# Patient Record
Sex: FEMALE | Race: WHITE
[De-identification: names, ages, dates, MRNs, and addresses within clinical notes are randomized per-mention and may not be internally consistent; named-entity substitution may affect disease eponyms.]

---

## 2018-02-15 ENCOUNTER — HOSPITAL ENCOUNTER (EMERGENCY)
Dept: HOSPITAL 59 - ER | Age: 28
Discharge: HOME | End: 2018-02-15
Payer: COMMERCIAL

## 2018-02-15 DIAGNOSIS — K59.00: ICD-10-CM

## 2018-02-15 DIAGNOSIS — R10.13: Primary | ICD-10-CM

## 2018-02-15 LAB
ALBUMIN SERPL-MCNC: 4.6 G/DL (ref 4–5)
ALP SERPL-CCNC: 64 U/L (ref 35–104)
ALT SERPL-CCNC: 12 U/L (ref ?–33)
ANION GAP SERPL CALC-SCNC: 11 MMOL/L (ref 7–16)
AST SERPL-CCNC: 13 U/L (ref 10–35)
BASOPHILS NFR BLD: 0.2 % (ref 0–6)
BILIRUB DIRECT SERPL-MCNC: < 0.2 MG/DL (ref 0–0.3)
BILIRUB SERPL-MCNC: 0.3 MG/DL (ref 0.2–1)
BUN SERPL-MCNC: 15 MG/DL (ref 6–20)
CO2 SERPL-SCNC: 24 MMOL/L (ref 22–29)
CREAT SERPL-MCNC: 0.7 MG/DL (ref 0.5–0.9)
EOSINOPHIL NFR BLD: 1.1 % (ref 0–6)
ERYTHROCYTE [DISTWIDTH] IN BLOOD BY AUTOMATED COUNT: 12.6 % (ref 11.5–14.5)
EST GLOMERULAR FILTRATION RATE: > 60 ML/MIN
GLUCOSE SERPL-MCNC: 101 MG/DL (ref 74–109)
GRANULOCYTES NFR BLD: 67.5 % (ref 47–80)
HCT VFR BLD CALC: 38.6 % (ref 35–47)
HGB BLD-MCNC: 12.8 GM/DL (ref 11.6–16)
LIPASE SERPL-CCNC: 29 U/L (ref 13–60)
LYMPHOCYTES NFR BLD AUTO: 22.3 % (ref 16–45)
MCH RBC QN AUTO: 29.5 PG (ref 27–33)
MCHC RBC AUTO-ENTMCNC: 33.2 G/DL (ref 32–36)
MCV RBC AUTO: 88.9 FL (ref 81–97)
MONOCYTES NFR BLD: 8.9 % (ref 0–9)
PLATELET # BLD: 371 K/UL (ref 130–400)
PMV BLD AUTO: 10.1 FL (ref 7.4–10.4)
PROT SERPL-MCNC: 6.9 G/DL (ref 6.6–8.7)
RBC # BLD AUTO: 4.34 M/UL (ref 3.8–5.4)
WBC # BLD AUTO: 8.1 K/UL (ref 4.2–12.2)

## 2018-02-15 PROCEDURE — 74177 CT ABD & PELVIS W/CONTRAST: CPT

## 2018-02-15 PROCEDURE — 85025 COMPLETE CBC W/AUTO DIFF WBC: CPT

## 2018-02-15 PROCEDURE — 76700 US EXAM ABDOM COMPLETE: CPT

## 2018-02-15 PROCEDURE — 84703 CHORIONIC GONADOTROPIN ASSAY: CPT

## 2018-02-15 PROCEDURE — 96375 TX/PRO/DX INJ NEW DRUG ADDON: CPT

## 2018-02-15 PROCEDURE — 99284 EMERGENCY DEPT VISIT MOD MDM: CPT

## 2018-02-15 PROCEDURE — 80048 BASIC METABOLIC PNL TOTAL CA: CPT

## 2018-02-15 PROCEDURE — 96365 THER/PROPH/DIAG IV INF INIT: CPT

## 2018-02-15 PROCEDURE — 80076 HEPATIC FUNCTION PANEL: CPT

## 2018-02-15 PROCEDURE — 83690 ASSAY OF LIPASE: CPT

## 2018-02-15 NOTE — EMERGENCY DEPARTMENT RECORD
History of Present Illness





- General


Chief Complaint: Abdominal Pain


Stated Complaint: ABDOMINAL PAIN


Time Seen by Provider: 02/15/18 17:50


Source: Patient


Mode of Arrival: Ambulatory


Limitations: No limitations





- History of Present Illness


MD Complaint: Abdominal pain


Onset/Timin


-: Hour(s)


Location: Epigastric


Severity: Moderate


Quality: Burning, Stabbing


Consistency: Constant


Improves With: Nothing


Worsens With: Other


Associated Symptoms: Denies other symptoms


Treatments Prior to Arrival: Antacids





- Related Data


LMP Date: 18


Patient Pregnant: No


 Previous Rx's











 Medication  Instructions  Recorded


 


Hyoscyamine Sulfate [Levsin-Sl] 0.125 mg SL Q8H #15 tab.subl 02/15/18


 


Ondansetron [Zofran Odt] 4 mg PO Q8H #15 tab.rapdis 02/15/18











 Allergies











Allergy/AdvReac Type Severity Reaction Status Date / Time


 


codeine Allergy  headaches Verified 02/15/18 17:41














Travel Screening





- Travel/Exposure Within Last 30 Days


Have you traveled within the last 30 days?: No





- Travel/Exposure Within Last Year


Have you traveled outside the [place] in the last year?: No





- Additonal Travel Details


Have you been exposed to anyone with a communicable illness?: No





- Travel Symptoms


Symptom Screening: None





Review of Systems


Constitutional: Denies: Chills, Fever





Past Medical History





- SOCIAL HISTORY


Smoking Status: Never smoker


Alcohol Use: Occasional


Drug Use: None





- RESPIRATORY


Hx Respiratory Disorders: Yes


Hx Asthma: Yes





- CARDIOVASCULAR


Hx Cardio Disorders: No





- NEURO


Hx Neuro Disorders: No





- GI


Hx GI Disorders: No





- 


Hx Genitourinary Disorders: No





- ENDOCRINE


Hx Endocrine Disorders: No





- MUSCULOSKELETAL


Hx Musculoskeletal Disorders: No





- PSYCH


Hx Psych Problems: No





- HEMATOLOGY/ONCOLOGY


Hx Hematology/Oncology Disorders: No





Family Medical History


Any Significant Family History?: No





Physical Exam





- General


Limitations: No limitations





Course





 Vital Signs











  02/15/18 02/15/18 02/15/18





  17:43 19:40 20:49


 


Temperature 98.5 F  


 


Pulse Rate 91 H  


 


Pulse Rate [  89 66





Pulse Ox Probe]   


 


Respiratory 16 24 18





Rate   


 


Blood Pressure 104/68  


 


Blood Pressure  108/65 93/55





[Right Arm]   


 


Pulse Ox 98 100 100














  02/15/18





  21:27


 


Temperature 


 


Pulse Rate 


 


Pulse Rate [ 82





Pulse Ox Probe] 


 


Respiratory 18





Rate 


 


Blood Pressure 


 


Blood Pressure 96/61





[Right Arm] 


 


Pulse Ox 100














- Reevaluation(s)


Reevaluation #1: 





The case was signed out at the time of shift turn over


The initial labs were reviewed.  No acute changes on the labs


The US was reviewed.  No acute process.


Because of persistent pain a CT was performed that demonstrated large about of 

colonic stool


The patient had normal labs, no fever, no vomiting.


She was given Miralax Rx with Zofran and Levsin


She was given instructions regarding returning to the ED for recheck if not 

improving or any concerns


At the time of final discharge her pain was controlled and her questions were 

answered








Medical Decision Making





- Lab Data


Result diagrams: 


 02/15/18 18:15





 02/15/18 18:15





 Lab Results











  02/15/18 02/15/18 02/15/18 Range/Units





  18:15 18:15 18:15 


 


WBC  8.1    (4.2-12.2)  K/uL


 


RBC  4.34    (3.80-5.40)  M/uL


 


Hgb  12.8    (11.6-16.0)  gm/dl


 


Hct  38.6    (35.0-47.0)  %


 


MCV  88.9    (81-97)  fl


 


MCH  29.5    (27-33)  pg


 


MCHC  33.2    (32-36)  g/dl


 


RDW  12.6    (11.5-14.5)  %


 


Plt Count  371    (130-400)  K/uL


 


MPV  10.1    (7.4-10.4)  fl


 


Gran %  67.5    (47-80)  %


 


Lymphocytes %  22.3    (16-45)  %


 


Monocytes %  8.9    (0-9)  %


 


Eosinophils %  1.1    (0-6)  %


 


Basophils %  0.2    (0-6)  %


 


Sodium   135 L   (136-145)  mmol/L


 


Potassium   3.7   (3.4-4.5)  mmol/L


 


Chloride   100   ()  mmol/L


 


Carbon Dioxide   24.0   (22-29)  mmol/L


 


Anion Gap   11.0   (7-16)  


 


BUN   15   (6-20)  mg/dL


 


Creatinine   0.7   (0.5-0.9)  mg/dL


 


Estimated GFR   > 60   mL/min


 


Random Glucose   101   ()  mg/dL


 


Calcium   8.9   (8.6-10.0)  mg/dL


 


Total Bilirubin   0.30   (0.2-1.0)  mg/dL


 


Direct Bilirubin   < 0.2   (0-0.3)  mg/dL


 


AST   13   (10.0-35.0)  U/L


 


ALT   12   (<33)  U/L


 


Alkaline Phosphatase   64   ()  U/L


 


Total Protein   6.9   (6.6-8.7)  g/dL


 


Albumin   4.6   (4.0-5.0)  g/dL


 


Lipase   29   (13-60)  U/L


 


Serum HCG, Qual    Negative  (NEGATIVE)  














Disposition


Disposition: Discharge


Clinical Impression: 


 Abdominal pain, Constipation





Disposition: Home, Self-Care


Condition: (1) Good


Instructions:  Constipation (ED), Abdominal Pain (ED)


Additional Instructions: 


Return if you have fever, vomiting, or uncontrolled pain


Start the Miralax tomorrow as directed


Stay well hydrated


Prescriptions: 


Hyoscyamine Sulfate [Levsin-Sl] 0.125 mg SL Q8H #15 tab.subl


Ondansetron [Zofran Odt] 4 mg PO Q8H #15 tab.rapdis


Referrals: 


ADAM GARZA [DOCTOR OF OSTEOPATH] - 


[facility] [Provider Group]


Forms:  Patient Portal Access


Time of Disposition: 22:23





Quality





- Quality Measures


Quality Measures: N/A





- Blood Pressure Screening


Does Patient Have Any of the Following: No


Blood Pressure Classification: Normal BP Reading


Systolic Measurement: 92


Diastolic Measurement: 48


Screening for High Blood Pressure: < Normal BP, F/U Not Required > []

## 2018-02-15 NOTE — EMERGENCY DEPARTMENT RECORD
History of Present Illness





- General


Chief Complaint: Abdominal Pain


Stated Complaint: ABDOMINAL PAIN


Time Seen by Provider: 02/15/18 17:50


Source: Patient


Mode of Arrival: Ambulatory


Limitations: No limitations





- History of Present Illness


Initial Comments: 


The patient is here due to the acute onset of epigastric AP just over an hour 

ago. The pain is sharp and stabbing and radiates mildly laterally to each side. 

She denies any nausea, vomiting, diarrhea, or back pain. She has no hx of 

similar problems and no recent illnesses. The patient's only abdominal surgery 

is a BTL.





MD Complaint: Abdominal pain


Onset/Timin


-: Hour(s)


Location: Epigastric


Severity: Moderate


Quality: Burning, Stabbing


Consistency: Constant


Improves With: Nothing


Worsens With: Other


Associated Symptoms: Denies other symptoms


Treatments Prior to Arrival: Antacids





- Related Data


LMP Date: 18


Patient Pregnant: No


 Allergies











Allergy/AdvReac Type Severity Reaction Status Date / Time


 


codeine Allergy  headaches Verified 02/15/18 17:41














Travel Screening





- Travel/Exposure Within Last 30 Days


Have you traveled within the last 30 days?: No





- Travel/Exposure Within Last Year


Have you traveled outside the [place] in the last year?: No





- Additonal Travel Details


Have you been exposed to anyone with a communicable illness?: No





- Travel Symptoms


Symptom Screening: None





Review of Systems


Constitutional: Denies: Chills, Fever





Past Medical History





- SOCIAL HISTORY


Smoking Status: Never smoker


Alcohol Use: Occasional


Drug Use: None





- RESPIRATORY


Hx Respiratory Disorders: Yes


Hx Asthma: Yes





- CARDIOVASCULAR


Hx Cardio Disorders: No





- NEURO


Hx Neuro Disorders: No





- GI


Hx GI Disorders: No





- 


Hx Genitourinary Disorders: No





- ENDOCRINE


Hx Endocrine Disorders: No





- MUSCULOSKELETAL


Hx Musculoskeletal Disorders: No





- PSYCH


Hx Psych Problems: No





- HEMATOLOGY/ONCOLOGY


Hx Hematology/Oncology Disorders: No





Family Medical History


Any Significant Family History?: No





Physical Exam





- General


General Appearance: Alert, Oriented x3, Cooperative, No acute distress





- Head


Head exam: Atraumatic, Normocephalic, Normal inspection





- Eye


Eye exam: Normal appearance, PERRL





- Neck


Neck exam: Normal inspection, Full ROM.  negative: Tenderness





- Respiratory


Respiratory exam: Normal lung sounds bilaterally.  negative: Respiratory 

distress





- Cardiovascular


Cardiovascular Exam: Regular rate, Normal rhythm, Normal heart sounds





- GI/Abdominal


GI/Abdominal exam: Soft, Tenderness (There is mild to moderate epigastric 

tenderness to palpation. ).  negative: Distended, Hypoactive bowel sounds, 

Rebound, Rigid





- Extremities


Extremities exam: Normal inspection, Full ROM, Normal capillary refill.  

negative: Tenderness


Image of Full Body: 


  __________________________














  __________________________





 1 - Area of pain and tenderness.








Course





 Vital Signs











  02/15/18





  17:43


 


Temperature 98.5 F


 


Pulse Rate 91 H


 


Respiratory 16





Rate 


 


Blood Pressure 104/68


 


Pulse Ox 98














- Reevaluation(s)


Reevaluation #1: 


The patient was no better after the GI medicine so we did give her Carafate. 

Due to the pain in the epigastric area we did order an abdominal US.





The patient's care will be turned over to Dr. Garcia at 19:00 due to shift 

change.


02/15/18 18:43








Medical Decision Making





- Data Complexity


MDM Data: Labs Ordered and/or Reviewed





- Lab Data


Result diagrams: 


 02/15/18 18:15





 02/15/18 18:15





Disposition


Forms:  Patient Portal Access





Quality





- Quality Measures


Quality Measures: N/A





- Blood Pressure Screening


View Details: Yes


Does Patient Have Any of the Following: No


Blood Pressure Classification: Normal BP Reading


Systolic Measurement: 104


Diastolic Measurement: 68


Screening for High Blood Pressure: < Normal BP, F/U Not Required > []

## 2018-02-16 NOTE — ULTRASOUND REPORT
EXAM:  ULTRASOUND OF THE ABDOMEN



HISTORY:  EPIGASTRIC PAIN.  



TECHNIQUE:  Complete transabdominal ultrasound of the abdomen was obtained.  



Comparison:  None.  



FINDINGS:  The liver is homogeneous in echotexture without focal lesion.  The 
gallbladder is contracted, limiting its evaluation, however, no definitive 
gallbladder wall thickening.  No gallstones.  No pericholecystic fluid.  The 
CBD measures 3.8 mm.  The visualized pancreas is unremarkable.  The spleen is 
unremarkable at 10 cm.  The right kidney measures 10 x 5 cm and the left kidney 
measures 10 x 4 cm.  No renal calculus, mass, or hydronephrosis.  The 
visualized abdominal aorta and inferior vena cava are unremarkable.  No free 
fluid.  



IMPRESSION:  

UNREMARKABLE ULTRASOUND OF THE ABDOMEN.  



JOB NUMBER:  [id number]
[facility]

## 2018-02-16 NOTE — CT SCAN REPORT
EXAM:  CT OF THE ABDOMEN AND PELVIS



HISTORY:  PAIN.  



TECHNIQUE:  CT of the abdomen and pelvis was performed following the IV 
administration of 100 ml of Omnipaque 300 contrast.  Oral contrast was also 
administered.  



Comparison:  Ultrasound from today's date.  



FINDINGS:  Limited evaluation of the lung bases is unremarkable.  The osseous 
structures are grossly intact.  The liver, spleen, adrenal glands, pancreas, 
and kidneys are unremarkable.  The gallbladder is present.  No evidence for 
bowel obstruction.  Tiny fat containing periumbilical hernia.  There is a large 
amount of stool in the colon.  Normal appendix.  No free air.  Trace of free 
fluid in the pelvis which may be physiologic.  Bilateral ovarian follicles are 
suggested.  



IMPRESSION:  

THERE IS A LARGE AMOUNT OF STOOL IN THE COLON.  BILATERAL OVARIAN FOLLICLES 
WITH A TRACE OF FREE FLUID IN THE PELVIS.  THIS MAY BE PHYSIOLOGIC.  



JOB NUMBER:  [id number]
[facility]

## 2018-12-04 ENCOUNTER — HOSPITAL ENCOUNTER (OUTPATIENT)
Dept: HOSPITAL 59 - SUR | Age: 28
Discharge: HOME | End: 2018-12-04
Attending: OBSTETRICS & GYNECOLOGY
Payer: COMMERCIAL

## 2018-12-04 DIAGNOSIS — N92.0: Primary | ICD-10-CM

## 2018-12-04 PROCEDURE — 58563 HYSTEROSCOPY ABLATION: CPT

## 2018-12-04 PROCEDURE — 00952 ANES VAG PX HYSTSC&/HSG: CPT

## 2018-12-04 NOTE — OPERATIVE NOTE
DATE OF SERVICE:  12/04/2018



PREOPERATIVE DIAGNOSIS:  HEAVY MENSTRUAL BLEEDING.



POSTOPERATIVE DIAGNOSIS:  HEAVY MENSTRUAL BLEEDING.



PROCEDURE PERFORMED:  DIAGNOSTIC HYSTEROSCOPY, D&C WITH NOVASURE ABLATION.



PHYSICIAN:  JOSSY VILLALPANDO D.O.



ANESTHESIA:  GENERAL.



COMPLICATIONS:  NONE.



ESTIMATED BLOOD LOSS:  MINIMAL.



PATHOLOGY:  ENDOMETRIAL CURETTINGS.



PROCEDURE:  The patient was prepped for surgery and brought back to the 
Operative Suite. She was placed under general anesthesia. She was sterilely 
prepped and draped in the dorsal lithotomy position. A weighted speculum was 
placed in the vaginal vault. A single-tooth tenaculum was used to  the 
anterior lip of the cervix. The cervix was serially dilated to #14 [place]. The 
hysteroscope was then placed. Normal lining was visualized. There were no 
polyps noted. Both ostia were noted. The hysteroscope was then removed. 
Endometrial curettings were obtained. The uterus sounded to 9 cm. The NovaSure 
device was then placed. It had a length of 5 cm. The width was 2.8 cm. It ran 
at a power of 66 for 90 seconds. The NovaSure device was then removed. The 
tenaculum was removed. No bleeding was noted. The weighted speculum was 
removed. The patient was then awoken from general anesthesia and brought back 
to the Recovery Room in satisfactory condition.
MARIZOL